# Patient Record
Sex: FEMALE | Race: WHITE | NOT HISPANIC OR LATINO
[De-identification: names, ages, dates, MRNs, and addresses within clinical notes are randomized per-mention and may not be internally consistent; named-entity substitution may affect disease eponyms.]

---

## 2020-12-23 PROBLEM — Z00.00 ENCOUNTER FOR PREVENTIVE HEALTH EXAMINATION: Status: ACTIVE | Noted: 2020-12-23

## 2020-12-30 ENCOUNTER — RESULT REVIEW (OUTPATIENT)
Age: 55
End: 2020-12-30

## 2020-12-30 ENCOUNTER — APPOINTMENT (OUTPATIENT)
Dept: HEMATOLOGY ONCOLOGY | Facility: CLINIC | Age: 55
End: 2020-12-30
Payer: COMMERCIAL

## 2020-12-30 VITALS
HEIGHT: 65.35 IN | WEIGHT: 131 LBS | BODY MASS INDEX: 21.56 KG/M2 | OXYGEN SATURATION: 100 % | TEMPERATURE: 98.5 F | DIASTOLIC BLOOD PRESSURE: 84 MMHG | HEART RATE: 68 BPM | SYSTOLIC BLOOD PRESSURE: 133 MMHG | RESPIRATION RATE: 16 BRPM

## 2020-12-30 DIAGNOSIS — G89.29 PAIN IN LEFT HIP: ICD-10-CM

## 2020-12-30 DIAGNOSIS — G89.29 CERVICALGIA: ICD-10-CM

## 2020-12-30 DIAGNOSIS — Z78.9 OTHER SPECIFIED HEALTH STATUS: ICD-10-CM

## 2020-12-30 DIAGNOSIS — Z80.3 FAMILY HISTORY OF MALIGNANT NEOPLASM OF BREAST: ICD-10-CM

## 2020-12-30 DIAGNOSIS — Z80.1 FAMILY HISTORY OF MALIGNANT NEOPLASM OF TRACHEA, BRONCHUS AND LUNG: ICD-10-CM

## 2020-12-30 DIAGNOSIS — M25.552 PAIN IN LEFT HIP: ICD-10-CM

## 2020-12-30 DIAGNOSIS — Z80.0 FAMILY HISTORY OF MALIGNANT NEOPLASM OF DIGESTIVE ORGANS: ICD-10-CM

## 2020-12-30 DIAGNOSIS — N80.9 ENDOMETRIOSIS, UNSPECIFIED: ICD-10-CM

## 2020-12-30 DIAGNOSIS — E04.1 NONTOXIC SINGLE THYROID NODULE: ICD-10-CM

## 2020-12-30 DIAGNOSIS — Z80.52 FAMILY HISTORY OF MALIGNANT NEOPLASM OF BLADDER: ICD-10-CM

## 2020-12-30 DIAGNOSIS — M54.2 CERVICALGIA: ICD-10-CM

## 2020-12-30 PROCEDURE — 99072 ADDL SUPL MATRL&STAF TM PHE: CPT

## 2020-12-30 PROCEDURE — 99205 OFFICE O/P NEW HI 60 MIN: CPT

## 2020-12-30 NOTE — ASSESSMENT
[FreeTextEntry1] : #thrombocytopenia\par We have reviewed the differential diagnosis of thrombocytopenia\par Will review smear to confirm no evidence to schistocytes to r/o concern for TMA although no evidence of anemia. \par Will also review smear for pseudothrombocytopenia caused by clumping\par She does not take any medications that can cause DITP\par Will r/o Viruses including HIV, Hepatitis, EBV, CMV.\par Will check US of abd with attention to liver and spleen\par Denies ETOH use\par Will check for nutritional deficiencies including B12, Folate, copper, iron\par Will r/o AI d/o with RJ.\par If work up negative likely ITP. Would treat with steroids if plt <30 k\par \par #thyroid nodule - follows with endo (Dr Joycelyn Coyne)\par \par RTC in 1 week to review blood work and US\par

## 2020-12-30 NOTE — CONSULT LETTER
[Dear  ___] : Dear  [unfilled], [Consult Letter:] : I had the pleasure of evaluating your patient, [unfilled]. [Please see my note below.] : Please see my note below. [Consult Closing:] : Thank you very much for allowing me to participate in the care of this patient.  If you have any questions, please do not hesitate to contact me. [Sincerely,] : Sincerely, [FreeTextEntry3] : Claudia Stafford DO, FACKALYAN, FACP\par Medical Oncology and Hematology\par [facility] [place]\par

## 2020-12-30 NOTE — HISTORY OF PRESENT ILLNESS
[de-identified] : Ms. Tay is a 55 year old woman who presents for initial consultation of thrombocytopenia; referred by Dr. Nguyen.\par Lab work dated 2020 plt 140K, This was the first time she was told this.\par \par Age of Menarche: 13\par Age of Menopause: 52\par OCP: x10 years\par \par \par Family History\par Father Bladder Ca, dx age 75\par Paternal Uncle x2 Lung Ca dx 70's\par Paternal Uncle LIver Ca\par Maternal Aunt Breast and Endometrial Ca, dx 70's\par \par Working full time as \par \par Health Maintenance\par Colonoscopy 2020\par Pap 3/2020\par Mammogram 2020

## 2020-12-30 NOTE — REVIEW OF SYSTEMS
[Negative] : Allergic/Immunologic [Joint Pain] : joint pain [Easy Bruising] : a tendency for easy bruising [FreeTextEntry9] : neck and left hip

## 2020-12-31 ENCOUNTER — RESULT REVIEW (OUTPATIENT)
Age: 55
End: 2020-12-31

## 2021-01-04 ENCOUNTER — TRANSCRIPTION ENCOUNTER (OUTPATIENT)
Age: 56
End: 2021-01-04

## 2021-01-06 ENCOUNTER — APPOINTMENT (OUTPATIENT)
Dept: HEMATOLOGY ONCOLOGY | Facility: CLINIC | Age: 56
End: 2021-01-06
Payer: COMMERCIAL

## 2021-01-06 VITALS
HEART RATE: 85 BPM | DIASTOLIC BLOOD PRESSURE: 53 MMHG | OXYGEN SATURATION: 99 % | RESPIRATION RATE: 16 BRPM | BODY MASS INDEX: 21.91 KG/M2 | WEIGHT: 133.1 LBS | TEMPERATURE: 98.6 F | HEIGHT: 65.35 IN | SYSTOLIC BLOOD PRESSURE: 110 MMHG

## 2021-01-06 DIAGNOSIS — D69.6 THROMBOCYTOPENIA, UNSPECIFIED: ICD-10-CM

## 2021-01-06 PROCEDURE — 99072 ADDL SUPL MATRL&STAF TM PHE: CPT

## 2021-01-06 PROCEDURE — 99215 OFFICE O/P EST HI 40 MIN: CPT

## 2021-01-06 NOTE — CONSULT LETTER
[Dear  ___] : Dear  [unfilled], [Consult Letter:] : I had the pleasure of evaluating your patient, [unfilled]. [Please see my note below.] : Please see my note below. [Consult Closing:] : Thank you very much for allowing me to participate in the care of this patient.  If you have any questions, please do not hesitate to contact me. [Sincerely,] : Sincerely, [FreeTextEntry3] : Claudia Stafford DO, FACKALYAN, FACP\par Medical Oncology and Hematology\par [facility] [place]\par

## 2021-01-06 NOTE — REVIEW OF SYSTEMS
[Joint Pain] : joint pain [Easy Bruising] : a tendency for easy bruising [Negative] : Allergic/Immunologic [FreeTextEntry9] : neck and left hip

## 2021-01-06 NOTE — ASSESSMENT
[FreeTextEntry1] : #thrombocytopenia\par HIV, Hepatitis, EBV are negative\par Denies ETOH use\par B12, Folate, copper, iron - within normal limits\par RJ negative\par on repeat platelets were within normal limits\par \par #1.8 X 1.6 X 1.6 cm echogenic mass within the R hepatic lobe. This is nonspecific but likely to represent hemangioma. \par Will check MRI\par \par #thyroid nodule - follows with endo (Dr Joycelyn Coyne)\par \par Told to call for results of MRI 2 days after otherwise given normal labs and workup can return to PCP for monitoring of labs. If MRI concerning will arrange a visit to discuss biopsy.

## 2021-01-06 NOTE — HISTORY OF PRESENT ILLNESS
[de-identified] : Ms. Tay is a 55 year old woman who presents for initial consultation of thrombocytopenia; referred by Dr. Nguyen.\par Lab work dated 2020 plt 140K, This was the first time she was told this.\par \par Age of Menarche: 13\par Age of Menopause: 52\par OCP: x10 years\par \par \par Family History\par Father Bladder Ca, dx age 75\par Paternal Uncle x2 Lung Ca dx 70's\par Paternal Uncle LIver Ca\par Maternal Aunt Breast and Endometrial Ca, dx 70's\par \par Working full time as \par \par Health Maintenance\par Colonoscopy 2020\par Pap 3/2020\par Mammogram 2020 [de-identified] : Patient seen and examined and here today for follow up\par Denies bleeding or other complaints

## 2021-01-12 DIAGNOSIS — R16.0 HEPATOMEGALY, NOT ELSEWHERE CLASSIFIED: ICD-10-CM

## 2021-02-03 RX ORDER — ALPRAZOLAM 0.25 MG/1
0.25 TABLET ORAL
Qty: 1 | Refills: 0 | Status: ACTIVE | COMMUNITY
Start: 2021-02-03 | End: 1900-01-01

## 2021-02-09 ENCOUNTER — RESULT REVIEW (OUTPATIENT)
Age: 56
End: 2021-02-09

## 2025-03-03 ENCOUNTER — APPOINTMENT (OUTPATIENT)
Dept: CARDIOLOGY | Facility: CLINIC | Age: 60
End: 2025-03-03
Payer: COMMERCIAL

## 2025-03-03 ENCOUNTER — NON-APPOINTMENT (OUTPATIENT)
Age: 60
End: 2025-03-03

## 2025-03-03 VITALS
BODY MASS INDEX: 22.72 KG/M2 | WEIGHT: 138 LBS | HEIGHT: 65.35 IN | OXYGEN SATURATION: 97 % | DIASTOLIC BLOOD PRESSURE: 76 MMHG | HEART RATE: 72 BPM | SYSTOLIC BLOOD PRESSURE: 120 MMHG

## 2025-03-03 VITALS — SYSTOLIC BLOOD PRESSURE: 124 MMHG | DIASTOLIC BLOOD PRESSURE: 76 MMHG

## 2025-03-03 DIAGNOSIS — E78.5 HYPERLIPIDEMIA, UNSPECIFIED: ICD-10-CM

## 2025-03-03 DIAGNOSIS — Z82.0 FAMILY HISTORY OF EPILEPSY AND OTHER DISEASES OF THE NERVOUS SYSTEM: ICD-10-CM

## 2025-03-03 DIAGNOSIS — E78.41 ELEVATED LIPOPROTEIN(A): ICD-10-CM

## 2025-03-03 DIAGNOSIS — U07.1 COVID-19: ICD-10-CM

## 2025-03-03 DIAGNOSIS — R94.31 ABNORMAL ELECTROCARDIOGRAM [ECG] [EKG]: ICD-10-CM

## 2025-03-03 PROCEDURE — 93000 ELECTROCARDIOGRAM COMPLETE: CPT

## 2025-03-03 PROCEDURE — 99204 OFFICE O/P NEW MOD 45 MIN: CPT

## 2025-03-04 PROBLEM — R94.31 ABNORMAL EKG: Status: ACTIVE | Noted: 2025-03-04

## 2025-03-04 PROBLEM — Z82.0 FAMILY HISTORY OF TIAS: Status: ACTIVE | Noted: 2025-03-04

## 2025-03-04 PROBLEM — U07.1 COVID-19: Status: RESOLVED | Noted: 2025-03-04 | Resolved: 2025-03-04

## 2025-03-04 PROBLEM — E78.5 HYPERLIPIDEMIA: Status: ACTIVE | Noted: 2025-03-03

## 2025-03-04 PROBLEM — E78.41 ELEVATED LIPOPROTEIN A LEVEL: Status: ACTIVE | Noted: 2025-03-03

## 2025-03-22 ENCOUNTER — RESULT REVIEW (OUTPATIENT)
Age: 60
End: 2025-03-22

## 2025-04-06 PROBLEM — R93.1 AGATSTON CORONARY ARTERY CALCIUM SCORE LESS THAN 100: Status: RESOLVED | Noted: 2025-04-06 | Resolved: 2025-04-06

## 2025-04-10 ENCOUNTER — APPOINTMENT (OUTPATIENT)
Dept: CARDIOLOGY | Facility: CLINIC | Age: 60
End: 2025-04-10
Payer: COMMERCIAL

## 2025-04-10 DIAGNOSIS — R94.31 ABNORMAL ELECTROCARDIOGRAM [ECG] [EKG]: ICD-10-CM

## 2025-04-10 PROCEDURE — 93306 TTE W/DOPPLER COMPLETE: CPT

## 2025-04-30 DIAGNOSIS — R93.1 ABNORMAL FINDINGS ON DIAGNOSTIC IMAGING OF HEART AND CORONARY CIRCULATION: ICD-10-CM

## 2025-04-30 DIAGNOSIS — Z92.89 PERSONAL HISTORY OF OTHER MEDICAL TREATMENT: ICD-10-CM

## 2025-05-30 ENCOUNTER — APPOINTMENT (OUTPATIENT)
Dept: CARDIOLOGY | Facility: CLINIC | Age: 60
End: 2025-05-30

## 2025-06-06 ENCOUNTER — RESULT REVIEW (OUTPATIENT)
Age: 60
End: 2025-06-06

## 2025-06-30 ENCOUNTER — APPOINTMENT (OUTPATIENT)
Dept: CARDIOLOGY | Facility: CLINIC | Age: 60
End: 2025-06-30
Payer: COMMERCIAL

## 2025-06-30 VITALS
WEIGHT: 136 LBS | DIASTOLIC BLOOD PRESSURE: 70 MMHG | HEART RATE: 60 BPM | OXYGEN SATURATION: 97 % | BODY MASS INDEX: 22.39 KG/M2 | SYSTOLIC BLOOD PRESSURE: 120 MMHG

## 2025-06-30 PROCEDURE — 99214 OFFICE O/P EST MOD 30 MIN: CPT

## 2025-06-30 RX ORDER — ROSUVASTATIN CALCIUM 5 MG/1
5 TABLET, FILM COATED ORAL
Qty: 90 | Refills: 3 | Status: ACTIVE | COMMUNITY
Start: 2025-06-30 | End: 1900-01-01